# Patient Record
Sex: FEMALE | Race: WHITE
[De-identification: names, ages, dates, MRNs, and addresses within clinical notes are randomized per-mention and may not be internally consistent; named-entity substitution may affect disease eponyms.]

---

## 2019-06-01 ENCOUNTER — HOSPITAL ENCOUNTER (EMERGENCY)
Dept: HOSPITAL 25 - UCCORT | Age: 64
Discharge: HOME | End: 2019-06-01
Payer: COMMERCIAL

## 2019-06-01 VITALS — SYSTOLIC BLOOD PRESSURE: 164 MMHG | DIASTOLIC BLOOD PRESSURE: 83 MMHG

## 2019-06-01 DIAGNOSIS — F17.210: ICD-10-CM

## 2019-06-01 DIAGNOSIS — J40: Primary | ICD-10-CM

## 2019-06-01 PROCEDURE — 99202 OFFICE O/P NEW SF 15 MIN: CPT

## 2019-06-01 PROCEDURE — 71046 X-RAY EXAM CHEST 2 VIEWS: CPT

## 2019-06-01 PROCEDURE — G0463 HOSPITAL OUTPT CLINIC VISIT: HCPCS

## 2019-06-01 NOTE — UC
Respiratory Complaint HPI





- HPI Summary


HPI Summary: 





Ill with upper resp cold symptoms for 3 days then today it is more in her chest 

with a productive cough of yellow sputum. Pt is a smoker, but states she's down 

to 2 cigarettes a day.





- History of Current Complaint


Chief Complaint: UCGeneralIllness


Stated Complaint: CONGESTION,COUGH


Time Seen by Provider: 06/01/19 12:38


Hx Obtained From: Patient


Pregnant?: No


Onset/Duration: Gradual Onset


Severity Initially: Mild


Severity Currently: Mild


Pain Intensity: 0


Character: Cough: Productive


Aggravating Factors: Nothing


Alleviating Factors: Nothing


Associated Signs And Symptoms: Positive: URI, Nasal Congestion





- Allergies/Home Medications


Allergies/Adverse Reactions: 


 Allergies











Allergy/AdvReac Type Severity Reaction Status Date / Time


 


No Known Allergies Allergy   Verified 06/01/19 12:38














PMH/Surg Hx/FS Hx/Imm Hx


Previously Healthy: Yes





- Surgical History


Surgical History: Yes


Surgery Procedure, Year, and Place: brain surgery 20 years ago.  hysterectomy 

10 years ago





- Family History


Known Family History: Positive: Non-Contributory





- Social History


Alcohol Use: Occasionally


Substance Use Type: None


Smoking Status (MU): Light Every Day Tobacco Smoker


Amount Used/How Often: 2 cigarettes/day





Review of Systems


All Other Systems Reviewed And Are Negative: Yes


Respiratory: Positive: Cough - Productive cough of yellow sputum


Is Patient Immunocompromised?: No





Physical Exam


Triage Information Reviewed: Yes


Appearance: Well-Appearing, No Pain Distress, Well-Nourished


Vital Signs: 


 Initial Vital Signs











Temp  97.2 F   06/01/19 12:32


 


Pulse  69   06/01/19 12:32


 


Resp  18   06/01/19 12:32


 


BP  164/83   06/01/19 12:32


 


Pulse Ox  98   06/01/19 12:32











Vital Signs Reviewed: Yes


Eyes: Positive: Conjunctiva Clear


ENT: Positive: Hearing grossly normal, Pharynx normal, TMs normal, Uvula midline


Neck: Positive: Supple, Nontender, No Lymphadenopathy


Respiratory: Positive: No respiratory distress, No accessory muscle use, Rhonchi

, Wheezing - Mild wheezing with forced expiration, scattered mild rhonchi


Cardiovascular: Positive: RRR, No Murmur, Pulses Normal, Brisk Capillary Refill


Abdomen Description: Positive: Nontender, No Organomegaly, Soft


Bowel Sounds: Positive: Present


Musculoskeletal Exam: Normal


Neurological Exam: Normal


Psychological Exam: Normal


Skin Exam: Normal





Respiratory Course/Dx





- Course


Course Of Treatment: 





CXR:FINDINGS:





The heart and mediastinum are normal in size and contour.





The lungs are grossly clear. There is no evidence of large pleural effusion.





Visualized bones are normal for the patient's age.





There is no radiographic evidence of free air beneath the diaphragm





IMPRESSION:


No radiographic evidence of acute cardiopulmonary disease. 





Pt felt better after the duoneb treatment...will do a prednisone taper and Zpak.





- Differential Dx/Diagnosis


Provider Diagnosis: 


 Bronchitis








Discharge





- Sign-Out/Discharge


Documenting (check all that apply): Patient Departure


All imaging exams completed and their final reports reviewed: Yes





- Discharge Plan


Condition: Fair


Disposition: HOME


Prescriptions: 


Azithromyxin TOO (NF) [Z-Too (Zithromax) 250 mg tabs #6] 2 tab PO .TODAY, THEN 

1 DAILY #6 tab


predniSONE [Prednisone 20 MG TAB] 20 mg PO DAILY 9 Days #18 tablet


Patient Education Materials:  Acute Bronchitis (ED)


Referrals: 


[facility] [Outside]


No Primary Care Phys,NOPCP [Primary Care Provider] - 


Additional Instructions: 


Stop smoking, increase fluids, follow up with the clinic if no improvement in 3-

4 days.





- Billing Disposition and Condition


Condition: FAIR


Disposition: Home

## 2019-11-17 ENCOUNTER — HOSPITAL ENCOUNTER (EMERGENCY)
Dept: HOSPITAL 25 - UCCORT | Age: 64
Discharge: HOME | End: 2019-11-17
Payer: MEDICAID

## 2019-11-17 VITALS — DIASTOLIC BLOOD PRESSURE: 81 MMHG | SYSTOLIC BLOOD PRESSURE: 153 MMHG

## 2019-11-17 DIAGNOSIS — R09.81: ICD-10-CM

## 2019-11-17 DIAGNOSIS — J40: Primary | ICD-10-CM

## 2019-11-17 DIAGNOSIS — F17.210: ICD-10-CM

## 2019-11-17 PROCEDURE — 99212 OFFICE O/P EST SF 10 MIN: CPT

## 2019-11-17 PROCEDURE — G0463 HOSPITAL OUTPT CLINIC VISIT: HCPCS

## 2019-11-17 NOTE — UC
Respiratory Complaint HPI





- HPI Summary


HPI Summary: 





63 yo smoker with a 10 day history of productive cough and shortness of breath. 

No reported fever or chills. She has about a 10 pack year history of smoking 

with current efforts to stop. She has hx of bronchitis, and past steroid use, 

has never been dx'd with COPD. 


Currently lots of travel as her 6 yo grandson has a recurrent brain tumor and 

her father's health is failing. 





- History of Current Complaint


Chief Complaint: UCRespiratory


Stated Complaint: COUGH


Time Seen by Provider: 11/17/19 11:52


Hx Obtained From: Patient


Onset/Duration: Lasting Days - 1-


Timing: Intermittent Episodes


Severity Initially: Mild


Severity Currently: Moderate


Pain Intensity: 0


Character: Cough: Productive


Aggravating Factors: Deep Breaths, Recumbent Position


Alleviating Factors: Nothing


Associated Signs And Symptoms: Positive: URI.  Negative: Dyspnea, Hemoptysis





- Risk Factors


Pulmonary Embolism Risk Factors: Smoking


Cardiac Risk Factors: Smoking


Pseudomonas Risk Factors: Negative


Tuberculosis Risk Factors: Negative





- Allergies/Home Medications


Allergies/Adverse Reactions: 


 Allergies











Allergy/AdvReac Type Severity Reaction Status Date / Time


 


No Known Allergies Allergy   Verified 11/17/19 11:39











Home Medications: 


 Home Medications





Acetaminophen/Dextromethorphan [Cold & Cough Daytime 1000-30 mg/30Ml] 1 liq PO 

Q12H PRN 11/17/19 [History Confirmed 11/17/19]











PMH/Surg Hx/FS Hx/Imm Hx


Previously Healthy: Yes





- Surgical History


Surgical History: Yes


Surgery Procedure, Year, and Place: brain surgery 20 years ago.  hysterectomy 

10 years ago





- Family History


Known Family History: Positive: Respiratory Disease - father has COPD, Other - 

grandson has brain tumor recurrence.





- Social History


Occupation: Retired


Lives: Alone


Alcohol Use: Occasionally


Substance Use Type: None


Smoking Status (MU): Light Every Day Tobacco Smoker


Amount Used/How Often: 2 cigarettes/day





Review of Systems


All Other Systems Reviewed And Are Negative: Yes


Constitutional: Positive: Fatigue


Skin: Positive: Negative


Eyes: Positive: Negative


ENT: Positive: Sinus Congestion


Respiratory: Positive: Shortness Of Breath - mild at times, mostly when 

recumbent., Cough


Cardiovascular: Negative: Palpitations, Chest Pain


Gastrointestinal: Positive: Negative


Genitourinary: Positive: Negative


Motor: Positive: Negative


Neurovascular: Positive: Negative


Musculoskeletal: Positive: Negative


Neurological: Positive: Negative


Psychological: Positive: Negative


Is Patient Immunocompromised?: No





Physical Exam


Triage Information Reviewed: Yes


Appearance: No Pain Distress, Ill-Appearing - congested cough, Thin


Vital Signs: 


 Initial Vital Signs











Temp  98.7 F   11/17/19 11:40


 


Pulse  54   11/17/19 11:40


 


Resp  20   11/17/19 11:40


 


BP  153/81   11/17/19 11:40


 


Pulse Ox  98   11/17/19 11:40











Eye Exam: Normal


ENT: Positive: Pharyngeal erythema, TMs normal


Neck: Positive: Supple, Nontender, No Lymphadenopathy


Respiratory: Positive: No accessory muscle use, Rhonchi, Expiration - 

prolonged..  Negative: Wheezing


Cardiovascular Exam: Normal


Cardiovascular: Positive: RRR, No Murmur


Musculoskeletal Exam: Normal


Neurological Exam: Normal


Psychological Exam: Normal


Skin Exam: Normal





Respiratory Course/Dx





- Course


Course Of Treatment: 





doxycycline and prednisone for bronchitis/hx of smoking. 





- Differential Dx/Diagnosis


Differential Diagnosis/HQI/PQRI: Bronchitis, Laryngitis, Lower Resp Infection


Provider Diagnosis: 


 Bronchitis








Discharge ED





- Sign-Out/Discharge


Documenting (check all that apply): Patient Departure


All imaging exams completed and their final reports reviewed: No Studies





- Discharge Plan


Condition: Stable


Disposition: HOME


Prescriptions: 


DOXYcycline CAP(*) [DOXYcycline 100MG CAP(*)] 100 mg PO BID #20 cap


predniSONE [Prednisone 20 MG TAB] 40 mg PO DAILY #10 tablet


Patient Education Materials:  Acute Bronchitis (ED)


Referrals: 


No Primary Care Phys,NOPCP [Primary Care Provider] - 


Additional Instructions: 


your blood pressure is elevated to 153/81; this could be related to being 

unwell and fatigued, but please ensure that you have a re-check within a month. 


Begin use of doxycycline and prednisone for treatment of bronchitis. If you do 

not see improvement in 3 days, please have a follow up visit for reassessment. 

At some point, having lung function testing because of your smoking history 

would give you information on whether you have lung changes related to smoking. 





- Billing Disposition and Condition


Condition: STABLE


Disposition: Home